# Patient Record
Sex: FEMALE | Race: WHITE | NOT HISPANIC OR LATINO | Employment: FULL TIME | URBAN - METROPOLITAN AREA
[De-identification: names, ages, dates, MRNs, and addresses within clinical notes are randomized per-mention and may not be internally consistent; named-entity substitution may affect disease eponyms.]

---

## 2018-10-30 ENCOUNTER — OFFICE VISIT (OUTPATIENT)
Dept: URGENT CARE | Facility: CLINIC | Age: 33
End: 2018-10-30
Payer: COMMERCIAL

## 2018-10-30 VITALS
OXYGEN SATURATION: 99 % | HEIGHT: 64 IN | DIASTOLIC BLOOD PRESSURE: 60 MMHG | SYSTOLIC BLOOD PRESSURE: 100 MMHG | BODY MASS INDEX: 26.63 KG/M2 | HEART RATE: 85 BPM | WEIGHT: 156 LBS | TEMPERATURE: 97.5 F | RESPIRATION RATE: 20 BRPM

## 2018-10-30 DIAGNOSIS — J06.9 VIRAL URI: Primary | ICD-10-CM

## 2018-10-30 DIAGNOSIS — R30.0 DYSURIA: ICD-10-CM

## 2018-10-30 DIAGNOSIS — N30.10 INTERSTITIAL CYSTITIS: ICD-10-CM

## 2018-10-30 PROCEDURE — 99213 OFFICE O/P EST LOW 20 MIN: CPT | Performed by: FAMILY MEDICINE

## 2018-10-30 RX ORDER — LAMOTRIGINE 150 MG/1
150 TABLET ORAL
COMMUNITY

## 2018-10-30 RX ORDER — FLUTICASONE PROPIONATE 50 MCG
2 SPRAY, SUSPENSION (ML) NASAL DAILY
Qty: 1 BOTTLE | Refills: 0 | Status: SHIPPED | OUTPATIENT
Start: 2018-10-30 | End: 2018-11-06

## 2018-10-30 RX ORDER — DIAZEPAM 10 MG/1
TABLET ORAL
COMMUNITY
Start: 2018-10-08

## 2018-10-30 RX ORDER — METHENAMINE, SODIUM PHOSPHATE, MONOBASIC, MONOHYDRATE, PHENYL SALICYLATE, METHYLENE BLUE, AND HYOSCYAMINE SULFATE 120; 40.8; 36; 10; .12 MG/1; MG/1; MG/1; MG/1; MG/1
CAPSULE ORAL
COMMUNITY

## 2018-10-30 RX ORDER — CLONAZEPAM 1 MG/1
1 TABLET ORAL
COMMUNITY

## 2018-10-30 RX ORDER — GABAPENTIN 100 MG/1
100 CAPSULE ORAL
COMMUNITY
Start: 2018-10-08

## 2018-10-30 NOTE — LETTER
October 30, 2018     Patient: Zulma Novak   YOB: 1985   Date of Visit: 10/30/2018       To Whom it May Concern:    Zulma Novak was seen in my clinic on 10/30/2018  Recommend waiting on flu vaccine for 5 days until current viral upper respiratory infection clears    If you have any questions or concerns, please don't hesitate to call           Sincerely,          Laina Blackman DO        CC: No Recipients

## 2018-10-30 NOTE — PROGRESS NOTES
[facility]        NAME: Oumou Philip is a 34 y o  female  : 1985    MRN: [id number]  DATE: 2018  TIME: 8:04 PM    Assessment and Plan   Viral URI [J06 9]  1  Viral URI  fluticasone (FLONASE) 50 mcg/act nasal spray   2  Interstitial cystitis     3  Dysuria           Patient Instructions   Patient Instructions   Flonase as directed for viral upper respiratory infection  Increase fluid intake  Follow-up with urogynecology at [facility] for persistent dysuria related interstitial cystitis treatment        Proceed to  ER if symptoms worsen  Chief Complaint     Chief Complaint   Patient presents with    Cold Like Symptoms     patient reports yesterday started with a sore throat, nausea and stuffy nose  was seen by her urogyn 4 weeks ago due to dx: cyctitis  no fever or chills  History of Present Illness       HPI    Review of Systems   Review of Systems      Current Medications       Current Outpatient Prescriptions:     diazepam (VALIUM) 10 mg tablet, Insert one suppository per vagina once-twice a day , Disp: , Rfl:     gabapentin (NEURONTIN) 100 mg capsule, Take 100 mg by mouth, Disp: , Rfl:     clonazePAM (KlonoPIN) 1 mg tablet, Take 1 mg by mouth, Disp: , Rfl:     escitalopram (LEXAPRO) 10 mg tablet, Take 10 mg by mouth daily  , Disp: , Rfl:     fluticasone (FLONASE) 50 mcg/act nasal spray, 2 sprays into each nostril daily for 7 days, Disp: 1 Bottle, Rfl: 0    lamoTRIgine (LaMICtal) 150 MG tablet, Take 150 mg by mouth, Disp: , Rfl:     Meth-Hyo-M Bl-Na Phos-Ph Sal (URIBEL) 118 MG CAPS, Take by mouth, Disp: , Rfl:     Current Allergies     Allergies as of 10/30/2018    (No Known Allergies)            The following portions of the patient's history were reviewed and updated as appropriate: allergies, current medications, past family history, past medical history, past social history, past surgical history and problem list      Past Medical History:   Diagnosis Date  Anxiety     Psychiatric disorder     anxiety    Seizures ([facility] [address] )        History reviewed  No pertinent surgical history  No family history on file  Medications have been verified          Objective   /60   Pulse 85   Temp 97 5 °F (36 4 °C)   Resp 20   Ht 5' 4" (1 626 m)   Wt 70 8 kg (156 lb)   LMP 10/28/2018   SpO2 99%   BMI 26 78 kg/m²        Physical Exam     Physical Exam

## 2018-10-31 NOTE — PATIENT INSTRUCTIONS
Flonase as directed for viral upper respiratory infection  Increase fluid intake  Follow-up with urogynecology at [facility] for persistent dysuria related interstitial cystitis treatment

## 2019-06-06 ENCOUNTER — OFFICE VISIT (OUTPATIENT)
Dept: URGENT CARE | Facility: CLINIC | Age: 34
End: 2019-06-06
Payer: COMMERCIAL

## 2019-06-06 VITALS
HEIGHT: 64 IN | TEMPERATURE: 97.8 F | BODY MASS INDEX: 23.9 KG/M2 | OXYGEN SATURATION: 99 % | SYSTOLIC BLOOD PRESSURE: 116 MMHG | DIASTOLIC BLOOD PRESSURE: 70 MMHG | HEART RATE: 78 BPM | WEIGHT: 140 LBS | RESPIRATION RATE: 16 BRPM
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
## 2019-06-06 DIAGNOSIS — L30.9 DERMATITIS: Primary | ICD-10-CM

## 2019-06-06 PROCEDURE — 99283 EMERGENCY DEPT VISIT LOW MDM: CPT | Performed by: PHYSICIAN ASSISTANT

## 2019-06-06 PROCEDURE — G0382 LEV 3 HOSP TYPE B ED VISIT: HCPCS | Performed by: PHYSICIAN ASSISTANT

## 2019-06-06 RX ORDER — NORTRIPTYLINE HYDROCHLORIDE 10 MG/1
CAPSULE ORAL
Refills: 2 | COMMUNITY
Start: 2019-04-23

## 2019-06-06 RX ORDER — PENTOSAN POLYSULFATE SODIUM 100 MG/1
CAPSULE, GELATIN COATED ORAL
Refills: 5 | COMMUNITY
Start: 2019-05-24

## 2019-06-06 RX ORDER — HYDROXYZINE HYDROCHLORIDE 25 MG/1
25 TABLET, FILM COATED ORAL
COMMUNITY
Start: 2018-11-05

## 2020-06-18 ENCOUNTER — OFFICE VISIT (OUTPATIENT)
Dept: OBGYN CLINIC | Facility: CLINIC | Age: 35
End: 2020-06-18
Payer: COMMERCIAL

## 2020-06-18 ENCOUNTER — APPOINTMENT (OUTPATIENT)
Dept: RADIOLOGY | Facility: CLINIC | Age: 35
End: 2020-06-18
Payer: COMMERCIAL

## 2020-06-18 VITALS
SYSTOLIC BLOOD PRESSURE: 116 MMHG | WEIGHT: 157 LBS | TEMPERATURE: 98.1 F | DIASTOLIC BLOOD PRESSURE: 70 MMHG | BODY MASS INDEX: 26.8 KG/M2 | HEIGHT: 64 IN

## 2020-06-18 DIAGNOSIS — M22.2X1 PATELLOFEMORAL SYNDROME OF RIGHT KNEE: Primary | ICD-10-CM

## 2020-06-18 DIAGNOSIS — M25.562 LEFT KNEE PAIN, UNSPECIFIED CHRONICITY: ICD-10-CM

## 2020-06-18 DIAGNOSIS — M25.561 RIGHT KNEE PAIN, UNSPECIFIED CHRONICITY: ICD-10-CM

## 2020-06-18 DIAGNOSIS — M22.2X2 PATELLOFEMORAL SYNDROME OF LEFT KNEE: ICD-10-CM

## 2020-06-18 PROBLEM — M17.11 PATELLOFEMORAL ARTHRITIS OF RIGHT KNEE: Status: ACTIVE | Noted: 2020-06-18

## 2020-06-18 PROCEDURE — 99243 OFF/OP CNSLTJ NEW/EST LOW 30: CPT | Performed by: ORTHOPAEDIC SURGERY

## 2020-06-18 PROCEDURE — 73564 X-RAY EXAM KNEE 4 OR MORE: CPT

## 2020-06-24 ENCOUNTER — EVALUATION (OUTPATIENT)
Dept: PHYSICAL THERAPY | Facility: CLINIC | Age: 35
End: 2020-06-24
Payer: COMMERCIAL

## 2020-06-24 DIAGNOSIS — M22.2X1 PATELLOFEMORAL SYNDROME OF RIGHT KNEE: Primary | ICD-10-CM

## 2020-06-24 DIAGNOSIS — M22.2X2 PATELLOFEMORAL SYNDROME OF LEFT KNEE: ICD-10-CM

## 2020-06-24 PROCEDURE — 97110 THERAPEUTIC EXERCISES: CPT | Performed by: PHYSICAL THERAPIST

## 2020-06-24 PROCEDURE — 97162 PT EVAL MOD COMPLEX 30 MIN: CPT | Performed by: PHYSICAL THERAPIST

## 2020-06-29 ENCOUNTER — OFFICE VISIT (OUTPATIENT)
Dept: PHYSICAL THERAPY | Facility: CLINIC | Age: 35
End: 2020-06-29
Payer: COMMERCIAL

## 2020-06-29 DIAGNOSIS — M22.2X1 PATELLOFEMORAL SYNDROME OF RIGHT KNEE: Primary | ICD-10-CM

## 2020-06-29 DIAGNOSIS — M22.2X2 PATELLOFEMORAL SYNDROME OF LEFT KNEE: ICD-10-CM

## 2020-06-29 PROCEDURE — 97112 NEUROMUSCULAR REEDUCATION: CPT | Performed by: PHYSICAL THERAPIST

## 2020-06-29 PROCEDURE — 97140 MANUAL THERAPY 1/> REGIONS: CPT | Performed by: PHYSICAL THERAPIST

## 2020-06-29 PROCEDURE — 97110 THERAPEUTIC EXERCISES: CPT | Performed by: PHYSICAL THERAPIST

## 2020-07-01 ENCOUNTER — APPOINTMENT (OUTPATIENT)
Dept: PHYSICAL THERAPY | Facility: CLINIC | Age: 35
End: 2020-07-01
Payer: COMMERCIAL

## 2020-07-02 ENCOUNTER — OFFICE VISIT (OUTPATIENT)
Dept: PHYSICAL THERAPY | Facility: CLINIC | Age: 35
End: 2020-07-02
Payer: COMMERCIAL

## 2020-07-02 DIAGNOSIS — M22.2X1 PATELLOFEMORAL SYNDROME OF RIGHT KNEE: Primary | ICD-10-CM

## 2020-07-02 DIAGNOSIS — M22.2X2 PATELLOFEMORAL SYNDROME OF LEFT KNEE: ICD-10-CM

## 2020-07-02 PROCEDURE — 97110 THERAPEUTIC EXERCISES: CPT

## 2020-07-02 PROCEDURE — 97112 NEUROMUSCULAR REEDUCATION: CPT

## 2020-07-02 PROCEDURE — 97140 MANUAL THERAPY 1/> REGIONS: CPT

## 2020-07-02 NOTE — PROGRESS NOTES
Daily Note     Today's date: 2020  Patient name: Tammie Mccalulm  : 1985  MRN: [id number]  Referring provider: Xander Chung MD  Dx:   Encounter Diagnosis     ICD-10-CM    1  Patellofemoral syndrome of right knee M22 2X1    2  Patellofemoral syndrome of left knee M22 2X2                   Subjective: Pt reports she liked the ktape but it would not stay  C/o squatting and sit>stand that bothers her w/ pain in the patellar tendon  Objective: See treatment diary below      Assessment: Tolerated treatment fair  Patient c/o weakness w/ SLR w/ ER and hip add  Initiated trial of IASTM  Pt reported good relief post   Held ktape do to pt poor stick  Pt needs cont'd LE strengthening in hip add  Plan: Continue per plan of care  Progress treatment as tolerated         Dx: B PFPS  EPOC: 20  CO-MORBIDITIES: h/o seizures, anxiety  PERSONAL FACTORS: wants to return to recreational exercise  Precautions: none      Manuals            B AP joint mobs knees  th           B knee PROM  th np          K tape support B knees  th np          IASTM gentle  10' JK 10'          Neuro Re-Ed             [facility] w/ ball squeeze  10 x10" 10"x10          Bird dog  10 10x          Prone plank  20"x3 20"x3          Modified side planks             pball crunches                                       Ther Ex             bike 6' 6' 6'          HEP instruction 4'            Heel raises  20 20          Std gastroc stretch  1' B 1' B          HS stretch             Prone quad stretch  3x20" B 3x20"  B          SLR toe straight  10 B 10 B          SLR toe out  10 B 15 B          Leg press  45# 2x10 45# 10x2                                    Gait Training                                       Modalities             Cp post tx

## 2020-07-06 ENCOUNTER — OFFICE VISIT (OUTPATIENT)
Dept: PHYSICAL THERAPY | Facility: CLINIC | Age: 35
End: 2020-07-06
Payer: COMMERCIAL

## 2020-07-06 DIAGNOSIS — M22.2X1 PATELLOFEMORAL SYNDROME OF RIGHT KNEE: Primary | ICD-10-CM

## 2020-07-06 DIAGNOSIS — M22.2X2 PATELLOFEMORAL SYNDROME OF LEFT KNEE: ICD-10-CM

## 2020-07-06 PROCEDURE — 97140 MANUAL THERAPY 1/> REGIONS: CPT | Performed by: PHYSICAL THERAPIST

## 2020-07-06 PROCEDURE — 97110 THERAPEUTIC EXERCISES: CPT | Performed by: PHYSICAL THERAPIST

## 2020-07-06 PROCEDURE — 97112 NEUROMUSCULAR REEDUCATION: CPT | Performed by: PHYSICAL THERAPIST

## 2020-07-06 NOTE — PROGRESS NOTES
Daily Note     Today's date: 2020  Patient name: Thierry Sofia  : 1985  MRN: [id number]  Referring provider: Aylin Mesa MD  Dx:   Encounter Diagnosis     ICD-10-CM    1  Patellofemoral syndrome of right knee M22 2X1    2  Patellofemoral syndrome of left knee M22 2X2        Start Time: 1705          Subjective: Pt reports 5/10 pain today, "not too bad today"  She continues to have pain with rising from a chair  She reports the TENS used in last treatment helped  Objective: See treatment diary below      Assessment: Tolerated treatment fair  Patient continues to c/o weakness w/ SLR w/ ER and hip add  Cues given for core activation and keeping shoulders over elbows during prone plank  Pt demonstrated fatigue post-session  She will benefit from continued PT  Plan: Continue per plan of care  Progress treatment as tolerated         Dx: B PFPS  EPOC: 20  CO-MORBIDITIES: h/o seizures, anxiety  PERSONAL FACTORS: wants to return to recreational exercise  Precautions: none      Manuals           B AP joint mobs knees  th           B knee PROM  th np          K tape support B knees  th np          IASTM gentle  10' JK 10' 10 min         Neuro Re-Ed             [employer] w/ ball squeeze  10 x10" 10"x10 10"x10         Bird dog  10 10x x10         Prone plank  20"x3 20"x3 30"x3         Modified side planks             pball crunches                                       Ther Ex             bike 6' 6' 6' 6'         HEP instruction 4'            Heel raises  20 20          Std gastroc stretch  1' B 1' B 60" ea         HS stretch             Prone quad stretch  3x20" B 3x20"  B Strap 60" ea         SLR toe straight  10 B 10 B x15 ea         SLR toe out  10 B 15 B x15 ea         Leg press  45# 2x10 45# 10x2 45# 2x10                                   Gait Training                                       Modalities             Cp post tx

## 2020-07-09 ENCOUNTER — APPOINTMENT (OUTPATIENT)
Dept: PHYSICAL THERAPY | Facility: CLINIC | Age: 35
End: 2020-07-09
Payer: COMMERCIAL

## 2020-07-13 ENCOUNTER — OFFICE VISIT (OUTPATIENT)
Dept: PHYSICAL THERAPY | Facility: CLINIC | Age: 35
End: 2020-07-13
Payer: COMMERCIAL

## 2020-07-13 DIAGNOSIS — M22.2X2 PATELLOFEMORAL SYNDROME OF LEFT KNEE: Primary | ICD-10-CM

## 2020-07-13 DIAGNOSIS — M22.2X1 PATELLOFEMORAL SYNDROME OF RIGHT KNEE: ICD-10-CM

## 2020-07-13 PROCEDURE — 97110 THERAPEUTIC EXERCISES: CPT | Performed by: PHYSICAL THERAPIST

## 2020-07-13 PROCEDURE — 97140 MANUAL THERAPY 1/> REGIONS: CPT | Performed by: PHYSICAL THERAPIST

## 2020-07-13 NOTE — PROGRESS NOTES
Daily Note     Today's date: 2020  Patient name: Candace Deleon  : 1985  MRN: [id number]  Referring provider: Belgica Gordon MD  Dx:   Encounter Diagnosis     ICD-10-CM    1  Patellofemoral syndrome of left knee M22 2X2        Start Time: 1702          Subjective: Pt reports 5/10 pain today; she is having a "really good day" sx wise  She has been avoiding all exercise besides pilates secondary to fear of increased pain  Favorable response to IASTM last session  Objective: See treatment diary below      Assessment: Tolerated treatment fair  Pt continues to have pain with squatting and lunging  Unable to tolerate decline squat; cues required for chair squat to keep weight in heels and stick bottom out to activate glutes  Pt demonstrated fatigue post-session  She will benefit from continued PT  Plan: Continue per plan of care  Progress treatment as tolerated         Dx: B PFPS  EPOC: 20  CO-MORBIDITIES: h/o seizures, anxiety  PERSONAL FACTORS: wants to return to recreational exercise  Precautions: none      Manuals          B AP joint mobs knees  th           B knee PROM  th np          K tape support B knees  th np          IASTM gentle  10' JK 10' 10 min 10'        Neuro Re-Ed             [facility] w/ ball squeeze  10 x10" 10"x10 10"x10 10"x10        Bird dog  10 10x x10         Prone plank  20"x3 20"x3 30"x3 30"x4        Modified side planks             pball crunches                                       Ther Ex             bike 6' 6' 6' 6' 6'        HEP instruction 4'            Heel raises  20 20          Std gastroc stretch  1' B 1' B 60" ea 1' ea        HS stretch             Prone quad stretch  3x20" B 3x20"  B Strap 60" ea         SLR toe straight  10 B 10 B x15 ea x15 ea        SLR toe out  10 B 15 B x15 ea x15 ea        Leg press  45# 2x10 45# 10x2 45# 2x10         squat     To chair with airex pad 2x10                     Gait Training Modalities             Cp post tx

## 2020-07-16 ENCOUNTER — OFFICE VISIT (OUTPATIENT)
Dept: PHYSICAL THERAPY | Facility: CLINIC | Age: 35
End: 2020-07-16
Payer: COMMERCIAL

## 2020-07-16 DIAGNOSIS — M22.2X2 PATELLOFEMORAL SYNDROME OF LEFT KNEE: Primary | ICD-10-CM

## 2020-07-16 DIAGNOSIS — M22.2X1 PATELLOFEMORAL SYNDROME OF RIGHT KNEE: ICD-10-CM

## 2020-07-16 PROCEDURE — 97140 MANUAL THERAPY 1/> REGIONS: CPT

## 2020-07-16 PROCEDURE — 97112 NEUROMUSCULAR REEDUCATION: CPT

## 2020-07-16 PROCEDURE — 97110 THERAPEUTIC EXERCISES: CPT

## 2020-07-16 NOTE — PROGRESS NOTES
Daily Note     Today's date: 2020  Patient name: Hayden Glass  : 1985  MRN: [id number]  Referring provider: Rosenda Fonseca MD  Dx:   Encounter Diagnosis     ICD-10-CM    1  Patellofemoral syndrome of left knee M22 2X2    2  Patellofemoral syndrome of right knee M22 2X1                   Subjective: Pt reports she has been feeling a little better post the last couple of visits w/ the [employer]  Pt states still having a little pain w/ sit>stand and steps  Objective: See treatment diary below      Assessment: Tolerated treatment well  Patient exhibited good technique with therapeutic exercises and would benefit from continued PT for cont'd strengthening  Pt making good progress w/ pain relief  Plan: Continue per plan of care  Progress treatment as tolerated         Dx: B PFPS  EPOC: 20  CO-MORBIDITIES: h/o seizures, anxiety  PERSONAL FACTORS: wants to return to recreational exercise  Precautions: none      Manuals         B AP joint mobs knees  th    JK       B knee PROM  th np          K tape support B knees  th np          IASTM gentle  10' JK 10' 10 min 10' JK10'       Neuro Re-Ed             [facility] w/ ball squeeze  10 x10" 10"x10 10"x10 10"x10 10"x10       Bird dog  10 10x x10  10       Prone plank  20"x3 20"x3 30"x3 30"x4 30"x4       Modified side planks             pball crunches                                       Ther Ex             bike 6' 6' 6' 6' 6' 6'       HEP instruction 4'            Heel raises  20 20          Std gastroc stretch  1' B 1' B 60" ea 1' ea 1 ' ea       HS stretch             Prone quad stretch  3x20" B 3x20"  B Strap 60" ea  Strap 1 min       SLR toe straight  10 B 10 B x15 ea x15 ea 15x ea       SLR toe out  10 B 15 B x15 ea x15 ea x15 ea       Leg press  45# 2x10 45# 10x2 45# 2x10  55# 10x2       squat     To chair with airex pad 2x10 To chair  10x2                    Gait Training                                       Modalities Cp post tx      8'

## 2020-07-20 ENCOUNTER — OFFICE VISIT (OUTPATIENT)
Dept: PHYSICAL THERAPY | Facility: CLINIC | Age: 35
End: 2020-07-20
Payer: COMMERCIAL

## 2020-07-20 DIAGNOSIS — M22.2X1 PATELLOFEMORAL SYNDROME OF RIGHT KNEE: ICD-10-CM

## 2020-07-20 DIAGNOSIS — M22.2X2 PATELLOFEMORAL SYNDROME OF LEFT KNEE: Primary | ICD-10-CM

## 2020-07-20 PROCEDURE — 97110 THERAPEUTIC EXERCISES: CPT | Performed by: PHYSICAL THERAPIST

## 2020-07-20 PROCEDURE — 97140 MANUAL THERAPY 1/> REGIONS: CPT | Performed by: PHYSICAL THERAPIST

## 2020-07-20 NOTE — PROGRESS NOTES
Daily Note     Today's date: 2020  Patient name: Samm Carreon  : 1985  MRN: [id number]  Referring provider: Emmy Meza MD  Dx:   Encounter Diagnosis     ICD-10-CM    1  Patellofemoral syndrome of left knee M22 2X2    2  Patellofemoral syndrome of right knee M22 2X1                   Subjective: 6/10 "medium" pain today  More soreness this weekend, no reason  Objective: See treatment diary below      Assessment: Tolerated treatment well  Patient exhibited good technique with therapeutic exercises and would benefit from continued PT for cont'd strengthening  Pt making good progress w/ pain relief  Plan: Continue per plan of care  Progress treatment as tolerated         Dx: B PFPS  EPOC: 20  CO-MORBIDITIES: h/o seizures, anxiety  PERSONAL FACTORS: wants to return to recreational exercise  Precautions: none      Manuals        B AP joint mobs knees  th    JK       B knee PROM  th np          K tape support B knees  th np          IASTM gentle  10' JK 10' 10 min 10' JK10' [address] 10'      Neuro Re-Ed             [facility] w/ ball squeeze  10 x10" 10"x10 10"x10 10"x10 10"x10 2x15      Bird dog  10 10x x10  10       Prone plank  20"x3 20"x3 30"x3 30"x4 30"x4 30"x4      Modified side planks             pball crunches                                       Ther Ex             bike 6' 6' 6' 6' 6' 6' 7'      HEP instruction 4'            Heel raises  20 20          Std gastroc stretch  1' B 1' B 60" ea 1' ea 1 ' ea 1' ea      HS stretch             Prone quad stretch  3x20" B 3x20"  B Strap 60" ea  Strap 1 min       SLR toe straight  10 B 10 B x15 ea x15 ea 15x ea 2x10 ea      SLR toe out  10 B 15 B x15 ea x15 ea x15 ea 2x10 ea      Leg press  45# 2x10 45# 10x2 45# 2x10  55# 10x2 55" 2x10      squat     To chair with airex pad 2x10 To chair  10x2       Hip abd       Sidelying 2x10 ea      Gait Training                                       Modalities             Cp post tx 8'

## 2020-07-23 ENCOUNTER — APPOINTMENT (OUTPATIENT)
Dept: PHYSICAL THERAPY | Facility: CLINIC | Age: 35
End: 2020-07-23
Payer: COMMERCIAL

## 2020-07-27 ENCOUNTER — OFFICE VISIT (OUTPATIENT)
Dept: PHYSICAL THERAPY | Facility: CLINIC | Age: 35
End: 2020-07-27
Payer: COMMERCIAL

## 2020-07-27 DIAGNOSIS — M22.2X1 PATELLOFEMORAL SYNDROME OF RIGHT KNEE: ICD-10-CM

## 2020-07-27 DIAGNOSIS — M22.2X2 PATELLOFEMORAL SYNDROME OF LEFT KNEE: Primary | ICD-10-CM

## 2020-07-27 PROCEDURE — 97110 THERAPEUTIC EXERCISES: CPT

## 2020-07-27 PROCEDURE — 97140 MANUAL THERAPY 1/> REGIONS: CPT

## 2020-07-27 NOTE — PROGRESS NOTES
Daily Note     Today's date: 2020  Patient name: Samm Carreon  : 1985  MRN: [id number]  Referring provider: Emmy Meza MD  Dx:   Encounter Diagnosis     ICD-10-CM    1  Patellofemoral syndrome of left knee M22 2X2    2  Patellofemoral syndrome of right knee M22 2X1        Start Time: 1700  Stop Time: 1755  Total time in clinic (min): 55 minutes    Subjective: Pt reports both knees have been feeling better with no problems since LV  Continues to see improvement since beginning therapy  Notes she must reduce her visits to once a week  Objective: See treatment diary below      Assessment: Tolerated treatment well  Was able to perform all exercise with no complaints of increased pain  Most challenged with prone planks today  Occasional compensation with hip flexor musculature during SL hip abd, but was able to self correct once verbal/tactile cueing provided for proper form  Patient would benefit from continued PT to further improve strength, in effort to maximize function and return to recreational exercise pain free  Plan: Continue per plan of care  Progress as able       Dx: B PFPS  EPOC: 20  CO-MORBIDITIES: h/o seizures, anxiety  PERSONAL FACTORS: wants to return to recreational exercise  Precautions: none      Manuals   7     B AP joint mobs knees  th    JK       B knee PROM  th np          K tape support B knees  th np          IASTM gentle  10' JK 10' 10 min 10' JK10' [address] 10' [employer]  10'     Neuro Re-Ed             [facility] w/ ball squeeze  10 x10" 10"x10 10"x10 10"x10 10"x10 2x15 2x15     Bird dog  10 10x x10  10       Prone plank  20"x3 20"x3 30"x3 30"x4 30"x4 30"x4 30"x4     Modified side planks             pball crunches                                       Ther Ex             bike 6' 6' 6' 6' 6' 6' 7' 7'     HEP instruction 4'            Heel raises  20 20          Std gastroc stretch  1' B 1' B 60" ea 1' ea 1 ' ea 1' ea 30"x3 ea     HS stretch Prone quad stretch  3x20" B 3x20"  B Strap 60" ea  Strap 1 min       SLR toe straight  10 B 10 B x15 ea x15 ea 15x ea 2x10 ea 2x10 ea     SLR toe out  10 B 15 B x15 ea x15 ea x15 ea 2x10 ea 2x10 ea     Leg press  45# 2x10 45# 10x2 45# 2x10  55# 10x2 55" 2x10 55#  2x10     squat     To chair with airex pad 2x10 To chair  10x2       Hip abd       Sidelying 2x10 ea Sidelying 2x10 ea     Gait Training                                       Modalities             Cp post tx      8'

## 2020-07-30 ENCOUNTER — APPOINTMENT (OUTPATIENT)
Dept: PHYSICAL THERAPY | Facility: CLINIC | Age: 35
End: 2020-07-30
Payer: COMMERCIAL

## 2020-08-03 ENCOUNTER — OFFICE VISIT (OUTPATIENT)
Dept: PHYSICAL THERAPY | Facility: CLINIC | Age: 35
End: 2020-08-03
Payer: COMMERCIAL

## 2020-08-03 DIAGNOSIS — M22.2X1 PATELLOFEMORAL SYNDROME OF RIGHT KNEE: ICD-10-CM

## 2020-08-03 DIAGNOSIS — M22.2X2 PATELLOFEMORAL SYNDROME OF LEFT KNEE: Primary | ICD-10-CM

## 2020-08-03 PROCEDURE — 97110 THERAPEUTIC EXERCISES: CPT | Performed by: PHYSICAL THERAPIST

## 2020-08-03 PROCEDURE — 97112 NEUROMUSCULAR REEDUCATION: CPT | Performed by: PHYSICAL THERAPIST

## 2020-08-03 PROCEDURE — 97140 MANUAL THERAPY 1/> REGIONS: CPT | Performed by: PHYSICAL THERAPIST

## 2020-08-03 NOTE — PROGRESS NOTES
Daily Note     Today's date: 8/3/2020  Patient name: Josette Farfan  : 1985  MRN: [id number]  Referring provider: Vijaya Kirkland MD  Dx:   Encounter Diagnosis     ICD-10-CM    1  Patellofemoral syndrome of left knee  M22 2X2    2  Patellofemoral syndrome of right knee  M22 2X1        Start Time: 1730          Subjective: Able to squat today for exercise without much increase in pain  She is feeling decent currently, only low grade symptoms  Objective: See treatment diary below      Assessment: Tolerated treatment well  Good tolerance to progression in TE  Encouraged pt to exercise outside of PT as she feels comfortable, using pain as her guide  Cues provided to maintain shoulders over elbows in plank  Patient would benefit from continued PT to further improve strength, in effort to maximize function and return to recreational exercise pain free  Plan: Continue per plan of care  Progress as able       Dx: B PFPS  EPOC: 20  CO-MORBIDITIES: h/o seizures, anxiety  PERSONAL FACTORS: wants to return to recreational exercise  Precautions: none      Manuals  6/29 7/2 7/6 7/13 7/16 7/20 7/27 8/3    B AP joint mobs knees  th    JK       B knee PROM  th np          K tape support B knees  th np          IASTM gentle  10' JK 10' 10 min 10' JK10' [address] 10' [employer]  10' [address] 10'    Neuro Re-Ed             [facility] w/ ball squeeze  10 x10" 10"x10 10"x10 10"x10 10"x10 2x15 2x15 3x15    Bird dog  10 10x x10  10       Prone plank  20"x3 20"x3 30"x3 30"x4 30"x4 30"x4 30"x4 45"x3    Modified side planks             pball crunches                                       Ther Ex             bike 6' 6' 6' 6' 6' 6' 7' 7' 7'    HEP instruction 4'            Heel raises  20 20          Std gastroc stretch  1' B 1' B 60" ea 1' ea 1 ' ea 1' ea 30"x3 ea 30"x3 ea    HS stretch             Prone quad stretch  3x20" B 3x20"  B Strap 60" ea  Strap 1 min       SLR toe straight  10 B 10 B x15 ea x15 ea 15x ea 2x10 ea 2x10 ea 2x10 ea    SLR toe out  10 B 15 B x15 ea x15 ea x15 ea 2x10 ea 2x10 ea 2x10 ea    Leg press  45# 2x10 45# 10x2 45# 2x10  55# 10x2 55" 2x10 55#  2x10 65# 3x10    squat     To chair with airex pad 2x10 To chair  10x2       Hip abd       Sidelying 2x10 ea Sidelying 2x10 ea     Gait Training                                       Modalities             Cp post tx      8'

## 2020-08-10 ENCOUNTER — EVALUATION (OUTPATIENT)
Dept: PHYSICAL THERAPY | Facility: CLINIC | Age: 35
End: 2020-08-10
Payer: COMMERCIAL

## 2020-08-10 DIAGNOSIS — M22.2X1 PATELLOFEMORAL SYNDROME OF RIGHT KNEE: ICD-10-CM

## 2020-08-10 DIAGNOSIS — M22.2X2 PATELLOFEMORAL SYNDROME OF LEFT KNEE: Primary | ICD-10-CM

## 2020-08-10 PROCEDURE — 97140 MANUAL THERAPY 1/> REGIONS: CPT | Performed by: PHYSICAL THERAPIST

## 2020-08-10 PROCEDURE — 97110 THERAPEUTIC EXERCISES: CPT | Performed by: PHYSICAL THERAPIST

## 2020-08-10 NOTE — PROGRESS NOTES
PT Evaluation     Today's date: 8/10/2020  Patient name: Olivier Gonzalez  : 1985  MRN: [id number]  Referring provider: Estefania Garcia MD  Dx:   Encounter Diagnosis     ICD-10-CM    1  Patellofemoral syndrome of left knee  M22 2X2    2  Patellofemoral syndrome of right knee  M22 2X1        Start Time: 1720          Assessment  Assessment details: Pt is a 37y o  year old female coming to outpatient PT with a diagnosis of B PFPS  Pt presents with increase pain and TTP, good ROM and strength in B knees, (+) abnormal patellar tracking, pes planus and overall decreased functional mobility  Pt would benefit from skilled PT services in order to address these deficits and reach maximum level of function  Thank you kindly for the referral!      RE: 8/10/20  Cherie Cockayne has been attending physical therapy for 10 sessions and has made significant improvement in symptoms and function within this time, noted by improvements in strength, core stability, flexibility, balance and function  Her FOTO score has improved from 46 at initial evaluation to 77 currently  She has met all short term and long term goals  Recommend HEP to d/c at this time  Impairments: abnormal muscle firing, lacks appropriate home exercise program and pain with function  Understanding of Dx/Px/POC: good   Prognosis: good    Goals  STG's ( 3-4 weeks)  1  Pt will be independent in HEP - met  2  Decrease pain to 2-3/10 with activity - met    LTG's ( 6- 8 weeks)  1  Improve FOTO score by 8-10 points - met  2  Pt will have less pain when going up and down the steps - met  3  Pt will return to recreational ex with decreased pain - met  4  Pt will be able to transfer sit to stand with less pain - met    Plan  Plan details: D/c to HEP    Planned therapy interventions: home exercise program  Treatment plan discussed with: patient        Subjective Evaluation    History of Present Illness  Mechanism of injury: Pt reports B knee pain onset 2020 2* a new exercise routing with repetitive squatting activities  Pt has increased pain with going and down the steps, squatting, sit to stand activities from the chair and toilet  Pt has increased difficulty when getting up and down from the floor and ground  Pt had some relief with using a knee brace when working out  No pain when walking long distances or sitting for long time periods  Pt has noticed increased cracking in her knees  Work: computer work;  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  Hobbies: exercise, hang out with friend, shopping, reading        RE: 8/10/20  Pt reports 2/10 pain today  She is 80% better since beginning therapy as shown by decreased symptoms in her knees while exercising  She is no longer wearing the knee brace for exercise  She reports easily navigating stairs, sitting for prolonged periods of time and getting up and down from the floor without pain  She feels as though that remaining 20% of her symptoms/function will improve with compliance to her HEP over time    Pain  Current pain ratin  At worst pain ratin  Location: B knee  Quality: sharp and dull ache  Relieving factors: rest  Aggravating factors: stair climbing    Social Support  Steps to enter house: yes  6  Stairs in house: yes   20  Lives in: multiple-level home    Employment status: working    Diagnostic Tests  X-ray: normal  Treatments  No previous or current treatments  Patient Goals  Patient goals for therapy: return to sport/leisure activities and decreased pain  Patient goal: return back to recreational exercise        Objective     Neurological Testing     Sensation     Knee   Left Knee   Intact: light touch    Right Knee   Intact: light touch     Reflexes   Left   Patellar (L4): normal (2+)  Achilles (S1): normal (2+)    Right   Patellar (L4): normal (2+)  Achilles (S1): normal (2+)    Active Range of Motion   Left Knee   Normal active range of motion    Right Knee   Flexion: 130 degrees   Extension: 0 degrees     Additional Active Range of Motion Details  HS flexibility: R:70* 80* with opposite knee extended  (-) ligamentous laxity  (-) Tayler's test  (+) L > R pes planus in standing  (+) L patellar lateral tracking in standing    Passive Range of Motion   Left Knee   Normal passive range of motion    Right Knee   Normal passive range of motion    Strength/Myotome Testing     Left Hip   Planes of Motion   Flexion: 5  Extension: 5  Abduction: 5  Adduction: 5  External rotation: 5  Internal rotation: 5    Right Hip   Planes of Motion   Flexion: 5 (pain)  Extension: 5  Abduction: 5  Adduction: 5  External rotation: 5  Internal rotation: 5    Left Knee   Flexion: 5  Extension: 5    Right Knee   Flexion: 5  Extension: 5    Left Ankle/Foot   Dorsiflexion: 5  Plantar flexion: 5    Right Ankle/Foot   Dorsiflexion: 5  Plantar flexion: 5    Additional Strength Details  Ankle df MMT: 5/5    Functional Assessment        Comments  8/10/20  Gait: WFL    Squat: quad dominant with mild anterior tibial translation bilaterally; mild valgus bilaterally  Nonpainful  Balance:  SLS: 30 seconds without UE assist or LOB bilaterally  Flowsheet Rows      Most Recent Value   PT/OT G-Codes   Current Score  77   Projected Score  68            Dx:  B PFPS  EPOC: 8/5/20  CO-MORBIDITIES: h/o seizures, anxiety  PERSONAL FACTORS: wants to return to recreational exercise  Precautions: none    Manuals   6/29 7/2 7/6 7/13 7/16 7/20 7/27 8/3  8/10   B AP joint mobs knees   th       JK           B knee PROM   th np                 K tape support B knees   th np                 IASTM gentle   10' JK 10' 10 min 10' JK10' [address] 10' AFB  10' [address] 10'     RE          20'                Neuro Re-Ed                       Bridges w/ ball squeeze   10 x10" 10"x10 10"x10 10"x10 10"x10 2x15 2x15 3x15     Bird dog   10 10x x10   10           Prone plank   20"x3 20"x3 30"x3 30"x4 30"x4 30"x4 30"x4 45"x3     Modified side planks                       pball crunches                                                                       Ther Ex                       bike 10' 6' 6' 6' 6' 6' 7' 7' 7'  7'   HEP instruction 4'                     Heel raises   20 20                 Std gastroc stretch   1' B 1' B 60" ea 1' ea 1 ' ea 1' ea 30"x3 ea 30"x3 ea     HS stretch                       Prone quad stretch   3x20" B 3x20"  B Strap 60" ea   Strap 1 min           SLR toe straight   10 B 10 B x15 ea x15 ea 15x ea 2x10 ea 2x10 ea 2x10 ea  2x15 ea   SLR toe out   10 B 15 B x15 ea x15 ea x15 ea 2x10 ea 2x10 ea 2x10 ea  2x15 ea   Leg press   45# 2x10 45# 10x2 45# 2x10   55# 10x2 55" 2x10 55#  2x10 65# 3x10  95# 2x10   squat         To chair with airex pad 2x10 To chair  10x2           Hip abd             Sidelying 2x10 ea Sidelying 2x10 ea    side step with otb x10 ea   Gait Training                                                                       Modalities                       Cp post tx           8'

## 2020-08-17 ENCOUNTER — APPOINTMENT (OUTPATIENT)
Dept: PHYSICAL THERAPY | Facility: CLINIC | Age: 35
End: 2020-08-17
Payer: COMMERCIAL

## 2020-08-24 ENCOUNTER — APPOINTMENT (OUTPATIENT)
Dept: PHYSICAL THERAPY | Facility: CLINIC | Age: 35
End: 2020-08-24
Payer: COMMERCIAL

## 2020-08-31 ENCOUNTER — APPOINTMENT (OUTPATIENT)
Dept: PHYSICAL THERAPY | Facility: CLINIC | Age: 35
End: 2020-08-31
Payer: COMMERCIAL

## 2021-03-16 ENCOUNTER — TELEPHONE (OUTPATIENT)
Dept: DERMATOLOGY | Facility: CLINIC | Age: 36
End: 2021-03-16

## 2021-03-16 ENCOUNTER — OFFICE VISIT (OUTPATIENT)
Dept: DERMATOLOGY | Facility: CLINIC | Age: 36
End: 2021-03-16
Payer: COMMERCIAL

## 2021-03-16 VITALS — TEMPERATURE: 96.8 F | WEIGHT: 157 LBS | BODY MASS INDEX: 26.8 KG/M2 | HEIGHT: 64 IN

## 2021-03-16 DIAGNOSIS — L53.9 ERYTHEMA: Primary | ICD-10-CM

## 2021-03-16 DIAGNOSIS — D22.60 MULTIPLE BENIGN MELANOCYTIC NEVI OF UPPER AND LOWER EXTREMITIES AND TRUNK: ICD-10-CM

## 2021-03-16 DIAGNOSIS — R21 RASH: ICD-10-CM

## 2021-03-16 DIAGNOSIS — L85.3 XEROSIS OF SKIN: ICD-10-CM

## 2021-03-16 DIAGNOSIS — D22.70 MULTIPLE BENIGN MELANOCYTIC NEVI OF UPPER AND LOWER EXTREMITIES AND TRUNK: ICD-10-CM

## 2021-03-16 DIAGNOSIS — D22.5 MULTIPLE BENIGN MELANOCYTIC NEVI OF UPPER AND LOWER EXTREMITIES AND TRUNK: ICD-10-CM

## 2021-03-16 DIAGNOSIS — D18.01 CHERRY ANGIOMA: ICD-10-CM

## 2021-03-16 PROCEDURE — 99203 OFFICE O/P NEW LOW 30 MIN: CPT | Performed by: DERMATOLOGY

## 2021-03-16 NOTE — TELEPHONE ENCOUNTER
We can try the 595 nm PDL laser       Klarissa please call insurance and get a prior auth for:  ICD-10 for erythema: L53 9   CPT code [id number]    Please keep me updated

## 2021-03-16 NOTE — TELEPHONE ENCOUNTER
----- Message from Joe Mccoy MD sent at 3/16/2021  5:50 PM EDT -----  Regarding: FW: Non-Urgent Medical Question  Contact: [phone]  Devon Saul! I wanted to refer this patient to you for laser  Do you think you'd be able to treat this? It's like vascular, post inflammatory pigmentation after some sort of rash  It is slowly improving  There are three spots  Would you be able to do it and what price estimate can you give her? Photo is in her patient message    ----- Message -----  From: Jimmy Dueñas MA  Sent: 3/16/2021  12:50 PM EDT  To: Joe Mccoy MD  Subject: Non-Urgent Medical Question                      Forwarded to Dr Pierre Bruce     ----- Message -----  From: Nubia Sanchez  Sent: 3/16/2021  11:46 AM EDT  To: [facility]  Subject: Non-Urgent Medical Question                      Guerra for laser

## 2021-03-16 NOTE — PROGRESS NOTES
Formerly [facility], [facility] Note     Patient Name: Yajaira Caldwell  Encounter Date: 03/16/2021     Have you been cared for by a [facility] Dermatologist in the last 3 years and, if so, which one? No    · Have you traveled outside of the [address] [place] [address] in the past 3 months or outside of the [facility] area in the last 2 weeks? No     May we call your Preferred Phone number to discuss your specific medical information? Yes     May we leave a detailed message that includes your specific medical information? Yes      Today's Chief Concerns:   Concern #1:  Rash on left leg    Concern #2:  Full body exam     Past Medical History:  Have you personally ever had or currently have any of the following? · Skin cancer (such as Melanoma, Basal Cell Carcinoma, Squamous Cell Carcinoma? (If Yes, please provide more detail)- No  · Eczema: No  · Psoriasis: No  · HIV/AIDS: No  · Hepatitis B or C: No  · Tuberculosis: No  · Systemic Immunosuppression such as Diabetes, Biologic or Immunotherapy, Chemotherapy, Organ Transplantation, Bone Marrow Transplantation (If YES, please provide more detail): No  · Radiation Treatment (If YES, please provide more detail): No  · Any other major medical conditions/concerns? (If Yes, which types)- No    Social History:     What is/was your primary occupation?  What are your hobbies/past-times? Family History:  Have any of your "first degree relatives" (parent, brother, sister, or child) had any of the following       · Skin cancer such as Melanoma or Merkel Cell Carcinoma or Pancreatic Cancer? No  · Eczema, Asthma, Hay Fever or Seasonal Allergies: No  · Psoriasis or Psoriatic Arthritis: No  · Do any other medical conditions seem to run in your family? If Yes, what condition and which relatives?   No    Current Medications:         Current Outpatient Medications:     clonazePAM (KlonoPIN) 1 mg tablet, Take 1 mg by mouth, Disp: , Rfl:     escitalopram (LEXAPRO) 10 mg tablet, Take 10 mg by mouth daily  , Disp: , Rfl:     hydrOXYzine HCL (ATARAX) 25 mg tablet, Take 25 mg by mouth, Disp: , Rfl:     lamoTRIgine (LaMICtal) 150 MG tablet, Take 150 mg by mouth, Disp: , Rfl:     nortriptyline (PAMELOR) 10 mg capsule, TAKE ONE CAPSULE BY MOUTH AT NIGHT, Disp: , Rfl: 2    diazepam (VALIUM) 10 mg tablet, Insert one suppository per vagina once-twice a day , Disp: , Rfl:     ELMIRON 100 MG capsule, TAKE 1 CAP AT BED FOR 7 DAYS, TWICE A DAY FOR 7 DAYS, 1 IN AM AND 2 AT BED, Disp: , Rfl: 5    fluticasone (FLONASE) 50 mcg/act nasal spray, 2 sprays into each nostril daily for 7 days, Disp: 1 Bottle, Rfl: 0    gabapentin (NEURONTIN) 100 mg capsule, Take 100 mg by mouth, Disp: , Rfl:     Meth-Hyo-M Bl-Na Phos-Ph Sal (URIBEL) 118 MG CAPS, Take by mouth, Disp: , Rfl:     triamcinolone (KENALOG) 0 1 % ointment, Apply topically 2 (two) times a day for 7 days, Disp: 30 g, Rfl: 0      Review of Systems:  Have you recently had or currently have any of the following? If YES, what are you doing for the problem? · Fever, chills or unintended weight loss: No  · Sudden loss or change in your vision: No  · Nausea, vomiting or blood in your stool: No  · Painful or swollen joints: No  · Wheezing or cough: No  · Changing mole or non-healing wound: No  · Nosebleeds: No  · Excessive sweating: No  · Easy or prolonged bleeding? No  · Over the last 2 weeks, how often have you been bothered by the following problems? · Taking little interest or pleasure in doing things: 1 - Not at All  · Feeling down, depressed, or hopeless: 1 - Not at All  · Rapid heartbeat with epinephrine:  No    · FEMALES ONLY:    · Are you pregnant or planning to become pregnant? No  · Are you currently or planning to be nursing or breast feeding? No    · Any known allergies?       · No Known Allergies      Physical Exam:     Was a chaperone (Derm Clinical Assistant) present throughout the entire Physical Exam? Yes     Did the Dermatology Team specifically  the patient on the importance of a Full Skin Exam to be sure that nothing is missed clinically? Yes}  o Did the patient ultimately request or accept a Full Skin Exam?  Yes  o Did the patient specifically refuse to have the areas "under-the-bra" examined by the Dermatologist? No  o Did the patient specifically refuse to have the areas "under-the-underwear" examined by the Dermatologist? No    CONSTITUTIONAL:   Vitals:    03/16/21 1111   Temp: (!) 96 8 °F (36 °C)   TempSrc: Tympanic   Weight: 71 2 kg (157 lb)   Height: 5' 4" (1 626 m)         PSYCH: Normal mood and affect  EYES: Normal conjunctiva  ENT: Normal lips and oral mucosa  CARDIOVASCULAR: No edema  RESPIRATORY: Normal respirations  HEME/LYMPH/IMMUNO:  No regional lymphadenopathy except as noted below in "ASSESSMENT AND PLAN BY DIAGNOSIS"    SKIN:  FULL ORGAN SYSTEM EXAM   Hair, Scalp, Ears, Face Normal except as noted below in Assessment   Neck, Cervical Chain Nodes Normal except as noted below in Assessment   Right Arm/Hand/Fingers Normal except as noted below in Assessment   Left Arm/Hand/Fingers Normal except as noted below in Assessment   Chest/Breasts/Axillae Viewed areas Normal except as noted below in Assessment   Abdomen, Umbilicus Normal except as noted below in Assessment   Back/Spine Normal except as noted below in Assessment   Groin/Genitalia/Buttocks Normal except as noted below in Assessment   Right Leg, Foot, Toes Normal except as noted below in Assessment   Left Leg, Foot, Toes Normal except as noted below in Assessment        Assessment and Plan by Diagnosis:    History of Present Condition:     Duration:  How long has this been an issue for you?    o  3 months    Location Affected:  Where on the body is this affecting you?    o  left leg    Quality:  Is there any bleeding, pain, itch, burning/irritation, or redness associated with the skin lesion?     o  itch, redness, and burning  Severity:  Describe any bleeding, pain, itch, burning/irritation, or redness on a scale of 1 to 10 (with 10 being the worst)  o  6   Timing:  Does this condition seem to be there pretty constantly or do you notice it more at specific times throughout the day? o  constant    Context:  Have you ever noticed that this condition seems to be associated with specific activities you do?    o  denies    Modifying Factors:    o Anything that seems to make the condition worse?    -  denies   o What have you tried to do to make the condition better?    -  neosporin   Associated Signs and Symptoms:  Does this skin lesion seem to be associated with any of the following:  o  SL AMB DERM SIGNS AND SYMPTOMS: Redness and Itching and Scratching       1  RASH and erythema    Physical Exam:   (Anatomic Location); (Size and Morphological Description); (Differential Diagnosis):  o Left thigh; purpuric patches    Pertinent Positives:   Pertinent Negatives:      Assessment and Plan:  Based on a thorough discussion of this condition and the management approach to it (including a comprehensive discussion of the known risks, side effects and potential benefits of treatment), the patient (family) agrees to implement the following specific plan:   Monitor for changes    Initial rash resolved   Now with erythema   Consider laser therapy      2   MELANOCYTIC NEVI ("Moles")    Physical Exam:   Anatomic Location Affected:   Mostly on sun-exposed areas of the trunk and extremities   Morphological Description:  Scattered, 1-4mm round to ovoid, symmetrical-appearing, even bordered, skin colored to dark brown macules/papules, mostly in sun-exposed areas   Pertinent Positives:   Pertinent Negatives:    Assessment and Plan:  Based on a thorough discussion of this condition and the management approach to it (including a comprehensive discussion of the known risks, side effects and potential benefits of treatment), the patient (family) agrees to implement the following specific plan:   When outside we recommend using a wide brim hat, sunglasses, long sleeve and pants, sunscreen with SPF 24+ with reapplication every 2 hours, or SPF specific clothing    Benign, reassured   Annual skin check     Melanocytic Nevi  Melanocytic nevi ("moles") are tan or brown, raised or flat areas of the skin which have an increased number of melanocytes  Melanocytes are the cells in our body which make pigment and account for skin color  Some moles are present at birth (I e , "congenital nevi"), while others come up later in life (i e , "acquired nevi")  The sun can stimulate the body to make more moles  Sunburns are not the only thing that triggers more moles  Chronic sun exposure can do it too  Clinically distinguishing a healthy mole from melanoma may be difficult, even for experienced dermatologists  The "ABCDE's" of moles have been suggested as a means of helping to alert a person to a suspicious mole and the possible increased risk of melanoma  The suggestions for raising alert are as follows:    Asymmetry: Healthy moles tend to be symmetric, while melanomas are often asymmetric  Asymmetry means if you draw a line through the mole, the two halves do not match in color, size, shape, or surface texture  Asymmetry can be a result of rapid enlargement of a mole, the development of a raised area on a previously flat lesion, scaling, ulceration, bleeding or scabbing within the mole  Any mole that starts to demonstrate "asymmetry" should be examined promptly by a board certified dermatologist      Border: Healthy moles tend to have discrete, even borders  The border of a melanoma often blends into the normal skin and does not sharply delineate the mole from normal skin  Any mole that starts to demonstrate "uneven borders" should be examined promptly by a board certified dermatologist      Color: Healthy moles tend to be one color throughout  Melanomas tend to be made up of different colors ranging from dark black, blue, white, or red  Any mole that demonstrates a color change should be examined promptly by a board certified dermatologist      Diameter: Healthy moles tend to be smaller than 0 6 cm in size; an exception are "congenital nevi" that can be larger  Melanomas tend to grow and can often be greater than 0 6 cm (1/4 of an inch, or the size of a pencil eraser)  This is only a guideline, and many normal moles may be larger than 0 6 cm without being unhealthy  Any mole that starts to change in size (small to bigger or bigger to smaller) should be examined promptly by a board certified dermatologist      Evolving: Healthy moles tend to "stay the same "  Melanomas may often show signs of change or evolution such as a change in size, shape, color, or elevation  Any mole that starts to itch, bleed, crust, burn, hurt, or ulcerate or demonstrate a change or evolution should be examined promptly by a board certified dermatologist       Dysplastic Nevi  Dysplastic moles are moles that fit the ABCDE rules of melanoma but are not identified as melanomas when examined under the microscope  They may indicate an increased risk of melanoma in that person  If there is a family history of melanoma, most experts agree that the person may be at an increased risk for developing a melanoma  Experts still do not agree on what dysplastic moles mean in patients without a personal or family history of melanoma  Dysplastic moles are usually larger than common moles and have different colors within it with irregular borders  The appearance can be very similar to a melanoma  Biopsies of dysplastic moles may show abnormalities which are different from a regular mole  Melanoma  Malignant melanoma is a type of skin cancer that can be deadly if it spreads throughout the body  The incidence of melanoma in the [place] is growing faster than any other cancer  Melanoma usually grows near the surface of the skin for a period of time, and then begins to grow deeper into the skin  Once it grows deeper into the skin, the risk of spread to other organs greatly increases  Therefore, early detection and removal of a malignant melanoma may result in a better chance at a complete cure; removal after the tumor has spread may not be as effective, leading to worse clinical outcomes such as death  The true rate of nevus transformation into a melanoma is unknown  It has been estimated that the lifetime risk for any acquired melanocytic nevus on any 21year-old individual transforming into melanoma by age [de-identified] is 0 03% (1 in 3,164) for men and 0 009% (1 in 10,800) for women  The appearance of a "new mole" remains one of the most reliable methods for identifying a malignant melanoma  Occasionally, melanomas appear as rapidly growing, blue-black, dome-shaped bumps within a previous mole or previous area of normal skin  Other times, melanomas are suspected when a mole suddenly appears or changes  Itching, burning, or pain in a pigmented lesion should increase suspicion, but most patients with early melanoma have no skin discomfort whatsoever  Melanoma can occur anywhere on the skin, including areas that are difficult for self-examination  Many melanomas are first noticed by other family members  Suspicious-looking moles may be removed for microscopic examination  You may be able to prevent death from melanoma by doing two simple things:    1  Try to avoid unnecessary sun exposure and protect your skin when it is exposed to the sun  People who live near the equator, people who have intermittent exposures to large amounts of sun, and people who have had sunburns in childhood or adolescence have an increased risk for melanoma  Sun sense and vigilant sun protection may be keys to helping to prevent melanoma    We recommend wearing UPF-rated sun protective clothing and sunglasses whenever possible and applying a moisturizer-sunscreen combination product (SPF 50+) such as Neutrogena Daily Defense to sun exposed areas of skin at least three times a day  2  Have your moles regularly examined by a board certified dermatologist AND by yourself or a family member/friend at home  We recommend that you have your moles examined at least once a year by a board certified dermatologist   Use your birthday as an annual reminder to have your "Birthday Suit" (I e , your skin) examined; it is a nice birthday gift to yourself to know that your skin is healthy appearing! Additionally, at-home self examinations may be helpful for detecting a possible melanoma  Use the ABCDEs we discussed and check your moles once a month at home  3  CHERRY ANGIOMAS    Physical Exam:   Anatomic Location Affected:  trunk   Morphological Description:  Scattered cherry red, 1-4 mm papules   Pertinent Positives:   Pertinent Negatives:    Assessment and Plan:  Based on a thorough discussion of this condition and the management approach to it (including a comprehensive discussion of the known risks, side effects and potential benefits of treatment), the patient (family) agrees to implement the following specific plan:   Monitor for changes   Benign, reassured       Assessment and Plan:    Cherry angioma, also known as [employer] spots, are benign vascular skin lesions  A "cherry angioma" is a firm red, blue or purple papule, 0 1-1 cm in diameter  When thrombosed, they can appear black in colour until evaluated with a dermatoscope when the red or purple colour is more easily seen  Cherry angioma may develop on any part of the body but most often appear on the scalp, face, lips and trunk  An angioma is due to proliferating endothelial cells; these are the cells that line the inside of a blood vessel  Angiomas can arise in early life or later in life; the most common type of angioma is a cherry angioma  Cherry angiomas are very common in males and females of any age or race  They are more noticeable in white skin than in skin of colour  They markedly increase in number from about the age of 36  There may be a family history of similar lesions  Eruptive cherry angiomas have been rarely reported to be associated with internal malignancy  The cause of angiomas is unknown  Genetic analysis of cherry angiomas has shown that they frequently carry specific somatic missense mutations in the GNAQ and GNA11 (Q209H) genes, which are involved in other vascular and melanocytic proliferations  Cherry angioma is usually diagnosed clinically and no investigations are necessary for the majority of lesions  It has a characteristic red-clod or lobular pattern on dermatoscopy (called lacunar pattern using conventional pattern analysis)  When there is uncertainty about the diagnosis, a biopsy may be performed  The angioma is composed of venules in a thickened papillary dermis  Collagen bundles may be prominent between the lobules  Cherry angiomas are harmless, so they do not usually have to be treated  Occasionally, they are removed to exclude a malignant skin lesion such as a nodular melanoma or because they are irritated or bleeding (and a subsequent risk for infection)  To decrease friction over the lesions, we recommend Neutrogena Daily Defense SPF 50+ at least 3 times a day        4  XEROSIS ("DRY SKIN")    Physical Exam:   Anatomic Location Affected:  diffuse   Morphological Description:  xerosis   Pertinent Positives:   Pertinent Negatives:    Assessment and Plan:  Based on a thorough discussion of this condition and the management approach to it (including a comprehensive discussion of the known risks, side effects and potential benefits of treatment), the patient (family) agrees to implement the following specific plan:   Use moisturizer like Eucerin,Cerave or Aveeno Cream 3 times a day for the dry skin            Dry skin refers to skin that feels dry to touch  Dry skin has a dull surface with a rough, scaly quality  The skin is less pliable and cracked  When dryness is severe, the skin may become inflamed and fissured  Although any body site can be dry, dry skin tends to affect the shins more than any other site  Dry skin is lacking moisture in the outer horny cell layer (stratum corneum) and this results in cracks in the skin surface  Dry skin is also called xerosis, xeroderma or asteatosis (lack of fat)  It can affect males and females of all ages  There is some racial variability in water and lipid content of the skin   Dry skin that starts in early childhood may be one of about 20 types of ichthyosis (fish-scale skin)  There is often a family history of dry skin   Dry skin is commonly seen in people with atopic dermatitis   Nearly everyone > 60 years has dry skin  Dry skin that begins later may be seen in people with certain diseases and conditions   Postmenopausal women   Hypothyroidism   Chronic renal disease    Malnutrition and weight loss    Subclinical dermatitis    Treatment with certain drugs such as oral retinoids, diuretics and epidermal growth factor receptor inhibitors    People exposed to a dry environment may experience dry skin   Low humidity: in desert climates or cool, windy conditions    Excessive air conditioning    Direct heat from a fire or fan heater    Excessive bathing    Contact with soap, detergents and solvents    Inappropriate topical agents such as alcohol    Frictional irritation from rough clothing or abrasives    Dry skin is due to abnormalities in the integrity of the barrier function of the stratum corneum, which is made up of corneocytes   There is an overall reduction in the lipids in the stratum corneum   Ratio of ceramides, cholesterol and free fatty acids may be normal or altered      There may be a reduction in the proliferation of keratinocytes   Keratinocyte subtypes change in dry skin with a decrease in keratins K1, K10 and increase in K5, K14     Involucrin (a protein) may be expressed early, increasing cell stiffness   The result is retention of corneocytes and reduced water-holding capacity  The inherited forms of ichthyosis are due to loss of function mutations in various genes (listed in parentheses below)  The clinical features of ichthyosis depend on the specific type of ichthyosis:   Ichthyosis vulgaris (FLG)   Recessive X-linked ichthyosis (STS)    Autosomal recessive congenital ichthyosis (ABCA12, TGM1, ALOXE3)    Keratinopathic ichthyoses (KRT1, KRT10, KRT2)    Acquired ichthyosis may be due to:   Metabolic factors: thyroid deficiency    Illness: lymphoma, internal malignancy, sarcoidosis, HIV infection    Drugs: nicotinic acid, kava, protein kinase inhibitors (eg EGFR inhibitors), hydroxyurea  Complications of dry skin:  Dry areas of skin may become itchy, indicating a form of eczema/dermatitis has developed   Atopic eczema -- especially in people with ichthyosis vulgaris    Eczema craquelé -- especially in elderly people  Also called asteatotic eczema    A dry form of nummular dermatitis/discoid eczema -- especially in people that wash their skin excessively  When the dry skin of an elderly person is itchy without a visible rash, it is sometimes called winter itch, 7th age itch, senile pruritus or chronic pruritus of the elderly  Other complications of dry skin may include:   Skin infection when bacteria or viruses penetrate a break in the skin surface    Overheating, especially in some forms of ichthyosis    Food allergy, eg, to peanuts, has been associated with filaggrin mutations    Contact allergy, eg, to nickel, has also been correlated with barrier function defects  How is the type of dry skin diagnosed? The type of dry skin is diagnosed by careful history and examination      In children:   Family history    Age of onset    Appearance at birth, if known    Distribution of dry skin    Other features, eg eczema, abnormal nails, hair, dentition, sight, hearing  In adults:   Medical history    Medications and topical preparations    Bathing frequency and use of soap    Evaluation of environmental factors that may contribute to dry skin  What is the treatment for dry skin? The mainstay of treatment of dry skin and ichthyosis is moisturisers/emollients  They should be applied liberally and often enough to:   Reduce itch    Improve the barrier function    Prevent entry of irritants, bacteria    Reduce transepidermal water loss  When considering which emollient is most suitable, consider:   Severity of the dryness    Tolerance    Personal preference    Cost and availability  Emollients generally work best if applied to damp skin, if pH is below 7 (acidic), and if containing humectants such as urea or propylene glycol  Additional treatments include:   Topical steroid if itchy or there is dermatitis -- choose an emollient base    Topical calcineurin inhibitors if topical steroids are unsuitable  How can dry skin be prevented? Eliminate aggravating factors:   Reduce the frequency of bathing   A humidifier in winter and air conditioner in summer    Compare having a short shower with a prolonged soak in a bath   Use lukewarm, not hot, water   Replace standard soap with a substitute such as a synthetic detergent cleanser, water-miscible emollient, bath oil, anti-pruritic tar oil, colloidal oatmeal etc     Apply an emollient liberally and often, particularly shortly after bathing, and when itchy  The drier the skin, the thicker this should be, especially on the hands  What is the outlook for dry skin? A tendency to dry skin may persist life-long, or it may improve once contributing factors are controlled      Scribe Attestation    I,:  Matilde Haddad MA am acting as a scribe while in the presence of the attending physician :       I,:  Leonidas Lopez MD personally performed the services described in this documentation    as scribed in my presence :

## 2021-03-16 NOTE — PATIENT INSTRUCTIONS
Assessment and Plan:  Based on a thorough discussion of this condition and the management approach to it (including a comprehensive discussion of the known risks, side effects and potential benefits of treatment), the patient (family) agrees to implement the following specific plan:   Monitor for changes     Assessment and Plan:  Based on a thorough discussion of this condition and the management approach to it (including a comprehensive discussion of the known risks, side effects and potential benefits of treatment), the patient (family) agrees to implement the following specific plan:   When outside we recommend using a wide brim hat, sunglasses, long sleeve and pants, sunscreen with SPF 95+ with reapplication every 2 hours, or SPF specific clothing    Benign, reassured   Annual skin check     Melanocytic Nevi  Melanocytic nevi ("moles") are tan or brown, raised or flat areas of the skin which have an increased number of melanocytes  Melanocytes are the cells in our body which make pigment and account for skin color  Some moles are present at birth (I e , "congenital nevi"), while others come up later in life (i e , "acquired nevi")  The sun can stimulate the body to make more moles  Sunburns are not the only thing that triggers more moles  Chronic sun exposure can do it too  Clinically distinguishing a healthy mole from melanoma may be difficult, even for experienced dermatologists  The "ABCDE's" of moles have been suggested as a means of helping to alert a person to a suspicious mole and the possible increased risk of melanoma  The suggestions for raising alert are as follows:    Asymmetry: Healthy moles tend to be symmetric, while melanomas are often asymmetric  Asymmetry means if you draw a line through the mole, the two halves do not match in color, size, shape, or surface texture   Asymmetry can be a result of rapid enlargement of a mole, the development of a raised area on a previously flat lesion, scaling, ulceration, bleeding or scabbing within the mole  Any mole that starts to demonstrate "asymmetry" should be examined promptly by a board certified dermatologist      Border: Healthy moles tend to have discrete, even borders  The border of a melanoma often blends into the normal skin and does not sharply delineate the mole from normal skin  Any mole that starts to demonstrate "uneven borders" should be examined promptly by a board certified dermatologist      Color: Healthy moles tend to be one color throughout  Melanomas tend to be made up of different colors ranging from dark black, blue, white, or red  Any mole that demonstrates a color change should be examined promptly by a board certified dermatologist      Diameter: Healthy moles tend to be smaller than 0 6 cm in size; an exception are "congenital nevi" that can be larger  Melanomas tend to grow and can often be greater than 0 6 cm (1/4 of an inch, or the size of a pencil eraser)  This is only a guideline, and many normal moles may be larger than 0 6 cm without being unhealthy  Any mole that starts to change in size (small to bigger or bigger to smaller) should be examined promptly by a board certified dermatologist      Evolving: Healthy moles tend to "stay the same "  Melanomas may often show signs of change or evolution such as a change in size, shape, color, or elevation  Any mole that starts to itch, bleed, crust, burn, hurt, or ulcerate or demonstrate a change or evolution should be examined promptly by a board certified dermatologist       Dysplastic Nevi  Dysplastic moles are moles that fit the ABCDE rules of melanoma but are not identified as melanomas when examined under the microscope  They may indicate an increased risk of melanoma in that person  If there is a family history of melanoma, most experts agree that the person may be at an increased risk for developing a melanoma    Experts still do not agree on what dysplastic moles mean in patients without a personal or family history of melanoma  Dysplastic moles are usually larger than common moles and have different colors within it with irregular borders  The appearance can be very similar to a melanoma  Biopsies of dysplastic moles may show abnormalities which are different from a regular mole  Melanoma  Malignant melanoma is a type of skin cancer that can be deadly if it spreads throughout the body  The incidence of melanoma in the [place] is growing faster than any other cancer  Melanoma usually grows near the surface of the skin for a period of time, and then begins to grow deeper into the skin  Once it grows deeper into the skin, the risk of spread to other organs greatly increases  Therefore, early detection and removal of a malignant melanoma may result in a better chance at a complete cure; removal after the tumor has spread may not be as effective, leading to worse clinical outcomes such as death  The true rate of nevus transformation into a melanoma is unknown  It has been estimated that the lifetime risk for any acquired melanocytic nevus on any 21year-old individual transforming into melanoma by age [de-identified] is 0 03% (1 in 3,164) for men and 0 009% (1 in 10,800) for women  The appearance of a "new mole" remains one of the most reliable methods for identifying a malignant melanoma  Occasionally, melanomas appear as rapidly growing, blue-black, dome-shaped bumps within a previous mole or previous area of normal skin  Other times, melanomas are suspected when a mole suddenly appears or changes  Itching, burning, or pain in a pigmented lesion should increase suspicion, but most patients with early melanoma have no skin discomfort whatsoever  Melanoma can occur anywhere on the skin, including areas that are difficult for self-examination  Many melanomas are first noticed by other family members    Suspicious-looking moles may be removed for microscopic examination  You may be able to prevent death from melanoma by doing two simple things:    1  Try to avoid unnecessary sun exposure and protect your skin when it is exposed to the sun  People who live near the equator, people who have intermittent exposures to large amounts of sun, and people who have had sunburns in childhood or adolescence have an increased risk for melanoma  Sun sense and vigilant sun protection may be keys to helping to prevent melanoma  We recommend wearing UPF-rated sun protective clothing and sunglasses whenever possible and applying a moisturizer-sunscreen combination product (SPF 50+) such as Neutrogena Daily Defense to sun exposed areas of skin at least three times a day  2  Have your moles regularly examined by a board certified dermatologist AND by yourself or a family member/friend at home  We recommend that you have your moles examined at least once a year by a board certified dermatologist   Use your birthday as an annual reminder to have your "Birthday Suit" (I e , your skin) examined; it is a nice birthday gift to yourself to know that your skin is healthy appearing! Additionally, at-home self examinations may be helpful for detecting a possible melanoma  Use the ABCDEs we discussed and check your moles once a month at home  Assessment and Plan:  Based on a thorough discussion of this condition and the management approach to it (including a comprehensive discussion of the known risks, side effects and potential benefits of treatment), the patient (family) agrees to implement the following specific plan:   Monitor for changes   Benign, reassured       Assessment and Plan:    Cherry angioma, also known as [employer] spots, are benign vascular skin lesions  A "cherry angioma" is a firm red, blue or purple papule, 0 1-1 cm in diameter   When thrombosed, they can appear black in colour until evaluated with a dermatoscope when the red or purple colour is more easily seen  Cherry angioma may develop on any part of the body but most often appear on the scalp, face, lips and trunk  An angioma is due to proliferating endothelial cells; these are the cells that line the inside of a blood vessel  Angiomas can arise in early life or later in life; the most common type of angioma is a cherry angioma  Cherry angiomas are very common in males and females of any age or race  They are more noticeable in white skin than in skin of colour  They markedly increase in number from about the age of 36  There may be a family history of similar lesions  Eruptive cherry angiomas have been rarely reported to be associated with internal malignancy  The cause of angiomas is unknown  Genetic analysis of cherry angiomas has shown that they frequently carry specific somatic missense mutations in the GNAQ and GNA11 (Q209H) genes, which are involved in other vascular and melanocytic proliferations  Cherry angioma is usually diagnosed clinically and no investigations are necessary for the majority of lesions  It has a characteristic red-clod or lobular pattern on dermatoscopy (called lacunar pattern using conventional pattern analysis)  When there is uncertainty about the diagnosis, a biopsy may be performed  The angioma is composed of venules in a thickened papillary dermis  Collagen bundles may be prominent between the lobules  Cherry angiomas are harmless, so they do not usually have to be treated  Occasionally, they are removed to exclude a malignant skin lesion such as a nodular melanoma or because they are irritated or bleeding (and a subsequent risk for infection)  To decrease friction over the lesions, we recommend Neutrogena Daily Defense SPF 50+ at least 3 times a day      Assessment and Plan:  Based on a thorough discussion of this condition and the management approach to it (including a comprehensive discussion of the known risks, side effects and potential benefits of treatment), the patient (family) agrees to implement the following specific plan:   Use moisturizer like Eucerin,Cerave or Aveeno Cream 3 times a day for the dry skin               Dry skin refers to skin that feels dry to touch  Dry skin has a dull surface with a rough, scaly quality  The skin is less pliable and cracked  When dryness is severe, the skin may become inflamed and fissured  Although any body site can be dry, dry skin tends to affect the shins more than any other site  Dry skin is lacking moisture in the outer horny cell layer (stratum corneum) and this results in cracks in the skin surface  Dry skin is also called xerosis, xeroderma or asteatosis (lack of fat)  It can affect males and females of all ages  There is some racial variability in water and lipid content of the skin   Dry skin that starts in early childhood may be one of about 20 types of ichthyosis (fish-scale skin)  There is often a family history of dry skin   Dry skin is commonly seen in people with atopic dermatitis   Nearly everyone > 60 years has dry skin  Dry skin that begins later may be seen in people with certain diseases and conditions   Postmenopausal women   Hypothyroidism   Chronic renal disease    Malnutrition and weight loss    Subclinical dermatitis    Treatment with certain drugs such as oral retinoids, diuretics and epidermal growth factor receptor inhibitors    People exposed to a dry environment may experience dry skin   Low humidity: in desert climates or cool, windy conditions    Excessive air conditioning    Direct heat from a fire or fan heater    Excessive bathing    Contact with soap, detergents and solvents    Inappropriate topical agents such as alcohol    Frictional irritation from rough clothing or abrasives    Dry skin is due to abnormalities in the integrity of the barrier function of the stratum corneum, which is made up of corneocytes     There is an overall reduction in the lipids in the stratum corneum   Ratio of ceramides, cholesterol and free fatty acids may be normal or altered   There may be a reduction in the proliferation of keratinocytes   Keratinocyte subtypes change in dry skin with a decrease in keratins K1, K10 and increase in K5, K14     Involucrin (a protein) may be expressed early, increasing cell stiffness   The result is retention of corneocytes and reduced water-holding capacity  The inherited forms of ichthyosis are due to loss of function mutations in various genes (listed in parentheses below)  The clinical features of ichthyosis depend on the specific type of ichthyosis:   Ichthyosis vulgaris (FLG)   Recessive X-linked ichthyosis (STS)    Autosomal recessive congenital ichthyosis (ABCA12, TGM1, ALOXE3)    Keratinopathic ichthyoses (KRT1, KRT10, KRT2)    Acquired ichthyosis may be due to:   Metabolic factors: thyroid deficiency    Illness: lymphoma, internal malignancy, sarcoidosis, HIV infection    Drugs: nicotinic acid, kava, protein kinase inhibitors (eg EGFR inhibitors), hydroxyurea  Complications of dry skin:  Dry areas of skin may become itchy, indicating a form of eczema/dermatitis has developed   Atopic eczema -- especially in people with ichthyosis vulgaris    Eczema craquelé -- especially in elderly people  Also called asteatotic eczema    A dry form of nummular dermatitis/discoid eczema -- especially in people that wash their skin excessively  When the dry skin of an elderly person is itchy without a visible rash, it is sometimes called winter itch, 7th age itch, senile pruritus or chronic pruritus of the elderly      Other complications of dry skin may include:   Skin infection when bacteria or viruses penetrate a break in the skin surface    Overheating, especially in some forms of ichthyosis    Food allergy, eg, to peanuts, has been associated with filaggrin mutations    Contact allergy, eg, to nickel, has also been correlated with barrier function defects  How is the type of dry skin diagnosed? The type of dry skin is diagnosed by careful history and examination  In children:   Family history    Age of onset    Appearance at birth, if known    Distribution of dry skin    Other features, eg eczema, abnormal nails, hair, dentition, sight, hearing  In adults:   Medical history    Medications and topical preparations    Bathing frequency and use of soap    Evaluation of environmental factors that may contribute to dry skin  What is the treatment for dry skin? The mainstay of treatment of dry skin and ichthyosis is moisturisers/emollients  They should be applied liberally and often enough to:   Reduce itch    Improve the barrier function    Prevent entry of irritants, bacteria    Reduce transepidermal water loss  When considering which emollient is most suitable, consider:   Severity of the dryness    Tolerance    Personal preference    Cost and availability  Emollients generally work best if applied to damp skin, if pH is below 7 (acidic), and if containing humectants such as urea or propylene glycol  Additional treatments include:   Topical steroid if itchy or there is dermatitis -- choose an emollient base    Topical calcineurin inhibitors if topical steroids are unsuitable  How can dry skin be prevented? Eliminate aggravating factors:   Reduce the frequency of bathing   A humidifier in winter and air conditioner in summer    Compare having a short shower with a prolonged soak in a bath   Use lukewarm, not hot, water   Replace standard soap with a substitute such as a synthetic detergent cleanser, water-miscible emollient, bath oil, anti-pruritic tar oil, colloidal oatmeal etc     Apply an emollient liberally and often, particularly shortly after bathing, and when itchy  The drier the skin, the thicker this should be, especially on the hands      What is the outlook for dry skin? A tendency to dry skin may persist life-long, or it may improve once contributing factors are controlled

## 2021-03-25 ENCOUNTER — TELEPHONE (OUTPATIENT)
Dept: DERMATOLOGY | Facility: CLINIC | Age: 36
End: 2021-03-25

## 2021-03-25 NOTE — TELEPHONE ENCOUNTER
Pt left vm to cb for appt, called to set up appt with Dr Dane Grover but n/a so I left a vm to cb   jerry

## 2021-04-27 NOTE — TELEPHONE ENCOUNTER
I have procedure appointments open at [facility] for may 4th so if patient can come, please schedule her    Her insurance approved the PDL

## 2021-04-28 NOTE — TELEPHONE ENCOUNTER
Ptg called asking for an appt to have laser treatments done  I did see Dr Gail Magana note to schedule her on 05/04/21 at the [facility] location  ,bBut she states she got a letter from [employer] company saying that they approved her until 04/31/21  Will she need to get approval again for any appt that is made after that time?   Also, she wanted to know if this treatment is offered at the [facility] office  Please advise

## 2021-04-28 NOTE — TELEPHONE ENCOUNTER
That doesn't make sense that she was only approved till 4/31/21  Can you please check with her insurance? cert # is [id number]     PDL laser is only at [facility]

## 2021-04-28 NOTE — TELEPHONE ENCOUNTER
Called pt insurance company, spoke with Mushtaq DE LA FUENTE  She stated that the pt does have approval until 04/31/21  She was told the pt has not been scheduled yet, she was asked if she can extend it for her  She stated she can only extend it out to 05/31/2021  Call was then made to pt to inform her and schedule  Appt made for 05/05/21 at the [place] office  Should she come in 45 minutes before to be numbed or not? Thanks!

## 2021-05-04 ENCOUNTER — PROCEDURE VISIT (OUTPATIENT)
Dept: DERMATOLOGY | Facility: CLINIC | Age: 36
End: 2021-05-04

## 2021-05-04 DIAGNOSIS — L81.0 POST-INFLAMMATORY HYPERPIGMENTATION: Primary | ICD-10-CM

## 2021-05-04 PROCEDURE — NC001 PR NO CHARGE: Performed by: DERMATOLOGY

## 2021-05-04 RX ORDER — TRIAMCINOLONE ACETONIDE 1 MG/G
CREAM TOPICAL
Qty: 15 G | Refills: 0 | Status: SHIPPED | OUTPATIENT
Start: 2021-05-04

## 2021-05-04 NOTE — PROGRESS NOTES
Pulsed-Dye Laser treatment to Left thigh for the treatment of post inflammatory hyperpigmentation/erythema   Treatment #: 1  - explained to patient that I do not see a scar on clinical exam  She reports she had a rash in the area which then left these dark marks that she is very conscious about      After discussing potential procedure related risks including but not limited to pain, purpura, blistering, scarring, discoloration, footprinting, recurrence, inefficacy, need for additional treatment, and undesirable cosmetic written and verbal consent were obtained  Prior to the procedure, the patient washed his/her face  The treatment area was cleansed with alcohol  Eye protection was in place with metal shields  The patient was treated with  nm laser with these settings: fluence of 10 J/cm2, a pulse duration of 10 msec, spot size of 7 mm  The pulse count was 90  The clinical endpoint was erythema  Cold compress was applied after the procedure  Patient tolerated the procedure well with no immediate significant complications and left in stable condition  Aftercare was discussed  Continue to ice at home and keep the area moist with a gentle moisturizer  Advised the patient to avoid exercise for the next 24 hours and also to be cautious with sun exposure over the next week  Advised patient to call the office if there is excessive swelling  Patient is aware that it will take multiple treatments to treat this condition  The redness will soften but will not completely resolve  Start Triamcinolone 0 1 % cream twice a day for 2 weeks  Patient will message Jacqueline Marcano in [employer] and let her know how [facility] did with laser treatment          THIS IS A TEST TREATMENT  DO NOT BILL PATIENT OR INSURANCE   NO CHARGE         Scribe Attestation    I,:  Farrah Bright MA am acting as a scribe while in the presence of the attending physician :       I,:  Darnell Barrera MD personally performed the services described in this documentation    as scribed in my presence :